# Patient Record
Sex: MALE | Race: AMERICAN INDIAN OR ALASKA NATIVE
[De-identification: names, ages, dates, MRNs, and addresses within clinical notes are randomized per-mention and may not be internally consistent; named-entity substitution may affect disease eponyms.]

---

## 2019-04-07 NOTE — EMERGENCY DEPARTMENT REPORT
Blank Doc





- Documentation


Documentation: 





89 y/o male present to ED c/o asymptomatic HTN found while having dental proc

edure. Denies previous history





Recheck BP and start medications

## 2020-10-18 ENCOUNTER — HOSPITAL ENCOUNTER (EMERGENCY)
Dept: HOSPITAL 5 - ED | Age: 35
Discharge: HOME | End: 2020-10-18
Payer: COMMERCIAL

## 2020-10-18 VITALS — DIASTOLIC BLOOD PRESSURE: 94 MMHG | SYSTOLIC BLOOD PRESSURE: 156 MMHG

## 2020-10-18 DIAGNOSIS — M77.12: Primary | ICD-10-CM

## 2020-10-18 DIAGNOSIS — Z79.899: ICD-10-CM

## 2020-10-18 DIAGNOSIS — R03.0: ICD-10-CM

## 2020-10-18 LAB
ALBUMIN SERPL-MCNC: 4.4 G/DL (ref 3.9–5)
ALT SERPL-CCNC: 38 UNITS/L (ref 7–56)
BASOPHILS # (AUTO): 0 K/MM3 (ref 0–0.1)
BASOPHILS NFR BLD AUTO: 0.9 % (ref 0–1.8)
BUN SERPL-MCNC: 10 MG/DL (ref 9–20)
BUN/CREAT SERPL: 11 %
CALCIUM SERPL-MCNC: 9.3 MG/DL (ref 8.4–10.2)
EOSINOPHIL # BLD AUTO: 0.1 K/MM3 (ref 0–0.4)
EOSINOPHIL NFR BLD AUTO: 2.1 % (ref 0–4.3)
HCT VFR BLD CALC: 45.7 % (ref 35.5–45.6)
HEMOLYSIS INDEX: 59
HGB BLD-MCNC: 15.9 GM/DL (ref 11.8–15.2)
LYMPHOCYTES # BLD AUTO: 1.8 K/MM3 (ref 1.2–5.4)
LYMPHOCYTES NFR BLD AUTO: 31.3 % (ref 13.4–35)
MCHC RBC AUTO-ENTMCNC: 35 % (ref 32–34)
MCV RBC AUTO: 94 FL (ref 84–94)
MONOCYTES # (AUTO): 0.7 K/MM3 (ref 0–0.8)
MONOCYTES % (AUTO): 12.7 % (ref 0–7.3)
PLATELET # BLD: 248 K/MM3 (ref 140–440)
RBC # BLD AUTO: 4.86 M/MM3 (ref 3.65–5.03)

## 2020-10-18 PROCEDURE — 36415 COLL VENOUS BLD VENIPUNCTURE: CPT

## 2020-10-18 PROCEDURE — 85025 COMPLETE CBC W/AUTO DIFF WBC: CPT

## 2020-10-18 PROCEDURE — 80053 COMPREHEN METABOLIC PANEL: CPT

## 2020-10-18 NOTE — EMERGENCY DEPARTMENT REPORT
ED General Adult HPI





- General


Chief complaint: Extremity Injury, Upper


Stated complaint: LT ARM PAIN


Time Seen by Provider: 10/18/20 11:35


Source: patient


Mode of arrival: Ambulatory


Limitations: No Limitations





- History of Present Illness


Initial comments: 





35-year-old -American male patient presents with complaints of 

intermittent left elbow pain x1 month and elevated blood pressure x3 days.  

Patient reports that he does repetitive motions at work and first felt the pain 

after doing a quick sling back motion 1 month ago.  He denies any current pain, 

history of cancer, fever/chills/sweats, swelling of the elbow, decreased range 

of motion of the elbow/hand, or numbness/tingling/weakness in his arm or hand.  

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 

few days ago and noted to be in the 140s/109.  Patient denies any previous 

history of hypertension, headaches, vision changes, dizziness, chest pain, s

hortness of breath, urinary changes, or any past medical history.  He reports 

the last time he has been to a PCP was 2 years ago and his blood pressure was 

fine at that time.





- Related Data


                                  Previous Rx's











 Medication  Instructions  Recorded  Last Taken  Type


 


Meloxicam [Mobic] 15 mg PO QDAY #15 tablet 10/18/20 Unknown Rx











                                    Allergies











Allergy/AdvReac Type Severity Reaction Status Date / Time


 


No Known Allergies Allergy   Verified 10/18/20 09:11














ED Review of Systems


ROS: 


Stated complaint: LT ARM PAIN


Other details as noted in HPI





Constitutional: denies: chills, diaphoresis, fever, malaise, weakness


ENT: denies: ear pain, throat pain


Respiratory: denies: cough, shortness of breath


Cardiovascular: denies: chest pain


Gastrointestinal: denies: abdominal pain, nausea, vomiting


Genitourinary: denies: hematuria


Musculoskeletal: arthralgia.  denies: back pain, joint swelling


Skin: denies: rash, lesions


Neurological: denies: headache, weakness, numbness, paresthesias, confusion, 

abnormal gait, vertigo


Hematological/Lymphatic: denies: swollen glands





ED Past Medical Hx





- Past Medical History


Previous Medical History?: No





- Surgical History


Past Surgical History?: No





- Social History


Smoking Status: Never Smoker


Substance Use Type: Alcohol





- Medications


Home Medications: 


                                Home Medications











 Medication  Instructions  Recorded  Confirmed  Last Taken  Type


 


Meloxicam [Mobic] 15 mg PO QDAY #15 tablet 10/18/20  Unknown Rx














ED Physical Exam





- General


Limitations: No Limitations


General appearance: alert, in no apparent distress





- Head


Head exam: Present: atraumatic, normocephalic





- Eye


Eye exam: Present: normal appearance.  Absent: scleral icterus





- ENT


ENT exam: Present: mucous membranes moist





- Respiratory


Respiratory exam: Present: normal lung sounds bilaterally.  Absent: respiratory 

distress





- Cardiovascular


Cardiovascular Exam: Present: regular rate, normal rhythm.  Absent: systolic 

murmur, diastolic murmur, rubs, gallop





- GI/Abdominal


GI/Abdominal exam: Present: soft.  Absent: distended





- Extremities Exam


Extremities exam: Absent: other (Mild tenderness to palpation noted over left 

lateral epicondyle without erythema or swelling noted ; patient has full range 

of motion of the elbow and left hand and wrist with normal sensation and 

strength ;no edema noted bilaterally to lower extremities)





- Back Exam


Back exam: Present: full ROM





- Neurological Exam


Neurological exam: Present: alert, oriented X3, normal gait





- Psychiatric


Psychiatric exam: Present: normal affect, normal mood





- Skin


Skin exam: Present: warm, dry, intact, normal color.  Absent: rash, cyanosis





ED Course


                                   Vital Signs











  10/18/20 10/18/20





  09:13 12:54


 


Temperature 98.1 F 


 


Pulse Rate 75 68


 


Respiratory 18 16





Rate  


 


Blood Pressure 182/119 


 


Blood Pressure  156/94





[Left]  


 


O2 Sat by Pulse 79 L 99





Oximetry  














ED Medical Decision Making





- Lab Data


Result diagrams: 


                                 10/18/20 11:48





                                 10/18/20 11:48








                                   Lab Results











  10/18/20 10/18/20 Range/Units





  11:48 11:48 


 


WBC  5.8   (4.5-11.0)  K/mm3


 


RBC  4.86   (3.65-5.03)  M/mm3


 


Hgb  15.9 H   (11.8-15.2)  gm/dl


 


Hct  45.7 H   (35.5-45.6)  %


 


MCV  94   (84-94)  fl


 


MCH  33 H   (28-32)  pg


 


MCHC  35 H   (32-34)  %


 


RDW  13.1 L   (13.2-15.2)  %


 


Plt Count  248   (140-440)  K/mm3


 


Lymph % (Auto)  31.3   (13.4-35.0)  %


 


Mono % (Auto)  12.7 H   (0.0-7.3)  %


 


Eos % (Auto)  2.1   (0.0-4.3)  %


 


Baso % (Auto)  0.9   (0.0-1.8)  %


 


Lymph # (Auto)  1.8   (1.2-5.4)  K/mm3


 


Mono # (Auto)  0.7   (0.0-0.8)  K/mm3


 


Eos # (Auto)  0.1   (0.0-0.4)  K/mm3


 


Baso # (Auto)  0.0   (0.0-0.1)  K/mm3


 


Seg Neutrophils %  53.0   (40.0-70.0)  %


 


Seg Neutrophils #  3.1   (1.8-7.7)  K/mm3


 


Sodium   137  (137-145)  mmol/L


 


Potassium   3.9  (3.6-5.0)  mmol/L


 


Chloride   101.2  ()  mmol/L


 


Carbon Dioxide   24  (22-30)  mmol/L


 


Anion Gap   16  mmol/L


 


BUN   10  (9-20)  mg/dL


 


Creatinine   0.9  (0.8-1.3)  mg/dL


 


Estimated GFR   > 60  ml/min


 


BUN/Creatinine Ratio   11  %


 


Glucose   116 H  ()  mg/dL


 


Calcium   9.3  (8.4-10.2)  mg/dL


 


Total Bilirubin   0.40  (0.1-1.2)  mg/dL


 


AST   27  (5-40)  units/L


 


ALT   38  (7-56)  units/L


 


Alkaline Phosphatase   75  ()  units/L


 


Total Protein   8.1  (6.3-8.2)  g/dL


 


Albumin   4.4  (3.9-5)  g/dL


 


Albumin/Globulin Ratio   1.2  %














- Medical Decision Making








35-year-old -American male patient presents with complaints of 

intermittent left elbow pain x1 month and elevated blood pressure x3 days.  Pat

myrna reports that he does repetitive motions at work and first felt the pain 

after doing a quick sling back motion 1 month ago.  He denies any current pain, 

history of cancer, fever/chills/sweats, swelling of the elbow, decreased range 

of motion of the elbow/hand, or numbness/tingling/weakness in his arm or hand.  

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 

few days ago and noted to be in the 140s/109.  Patient denies any previous 

history of hypertension, headaches, vision changes, dizziness, chest pain, 

shortness of breath, urinary changes, or any past medical history.  He reports 

the last time he has been to a PCP was 2 years ago and his blood pressure was 

fine at that time.


Tenderness at the lateral epicondyles noted on exam without other acute findin

gs.  Blood pressure initially 182/119 upon arrival, now 156/94 without 

medication intervention.  Recommend patient follows up with primary care for 

further evaluation and official diagnosis of hypertension.  Discussed importance

 of diet and exercise and treatment of hypertension and strict return pr

ecautions in detail with patient who verbalized understanding.


Critical care attestation.: 


If time is entered above; I have spent that time in minutes in the direct care 

of this critically ill patient, excluding procedure time.








ED Disposition


Clinical Impression: 


 Elevated blood pressure reading





Lateral epicondylitis  of elbow


Qualifiers:


 Laterality: left Qualified Code(s): M77.12 - Lateral epicondylitis, left elbow





Disposition: DC-01 TO HOME OR SELFCARE


Is pt being admited?: No


Condition: Stable


Instructions:  Tennis Elbow (ED), Hypertension (ED)


Prescriptions: 


Meloxicam [Mobic] 15 mg PO QDAY #15 tablet


Referrals: 


JUDAH HARRINGTON MD [Staff Physician] - 2-3 Days (Possible Hypertension )